# Patient Record
Sex: MALE | Race: WHITE
[De-identification: names, ages, dates, MRNs, and addresses within clinical notes are randomized per-mention and may not be internally consistent; named-entity substitution may affect disease eponyms.]

---

## 2021-03-08 ENCOUNTER — HOSPITAL ENCOUNTER (INPATIENT)
Dept: HOSPITAL 83 - 5E | Age: 26
LOS: 3 days | Discharge: HOME | DRG: 897 | End: 2021-03-11
Attending: INTERNAL MEDICINE | Admitting: INTERNAL MEDICINE
Payer: COMMERCIAL

## 2021-03-08 VITALS — HEIGHT: 75 IN | BODY MASS INDEX: 29.62 KG/M2 | WEIGHT: 238.25 LBS

## 2021-03-08 VITALS — DIASTOLIC BLOOD PRESSURE: 62 MMHG | SYSTOLIC BLOOD PRESSURE: 148 MMHG

## 2021-03-08 VITALS — DIASTOLIC BLOOD PRESSURE: 70 MMHG

## 2021-03-08 VITALS — SYSTOLIC BLOOD PRESSURE: 119 MMHG | DIASTOLIC BLOOD PRESSURE: 66 MMHG

## 2021-03-08 VITALS — DIASTOLIC BLOOD PRESSURE: 62 MMHG

## 2021-03-08 DIAGNOSIS — E66.3: ICD-10-CM

## 2021-03-08 DIAGNOSIS — F10.239: Primary | ICD-10-CM

## 2021-03-08 DIAGNOSIS — R74.01: ICD-10-CM

## 2021-03-08 DIAGNOSIS — F41.9: ICD-10-CM

## 2021-03-08 DIAGNOSIS — Z88.0: ICD-10-CM

## 2021-03-08 LAB
ALBUMIN SERPL-MCNC: 4.2 GM/DL (ref 3.1–4.5)
ALP SERPL-CCNC: 73 U/L (ref 45–117)
ALT SERPL W P-5'-P-CCNC: 63 U/L (ref 12–78)
AMPHETAMINES UR QL SCN: < 1000
AST SERPL-CCNC: 37 IU/L (ref 3–35)
BARBITURATES UR QL SCN: < 200
BASOPHILS # BLD AUTO: 0 10*3/UL (ref 0–0.1)
BASOPHILS NFR BLD AUTO: 0.4 % (ref 0–1)
BENZODIAZ UR QL SCN: < 200
BUN SERPL-MCNC: 15 MG/DL (ref 7–24)
BZE UR QL SCN: < 300
CANNABINOIDS UR QL SCN: < 50
CHLORIDE SERPL-SCNC: 105 MMOL/L (ref 98–107)
CREAT SERPL-MCNC: 0.98 MG/DL (ref 0.7–1.3)
EOSINOPHIL # BLD AUTO: 0.2 10*3/UL (ref 0–0.4)
EOSINOPHIL # BLD AUTO: 2.7 % (ref 1–4)
ERYTHROCYTE [DISTWIDTH] IN BLOOD BY AUTOMATED COUNT: 11.9 % (ref 0–14.5)
ETHANOL SERPL-MCNC: < 3 MG/DL (ref ?–3)
HCT VFR BLD AUTO: 44.7 % (ref 42–52)
INR BLD: 1 (ref 2–3.5)
LYMPHOCYTES # BLD AUTO: 1.8 10*3/UL (ref 1.3–4.4)
LYMPHOCYTES NFR BLD AUTO: 27.1 % (ref 27–41)
MCH RBC QN AUTO: 30.5 PG (ref 27–31)
MCHC RBC AUTO-ENTMCNC: 34.9 G/DL (ref 33–37)
MCV RBC AUTO: 87.3 FL (ref 80–94)
METHADONE UR QL SCN: < 300
MONOCYTES # BLD AUTO: 0.5 10*3/UL (ref 0.1–1)
MONOCYTES NFR BLD MANUAL: 7.9 % (ref 3–9)
NEUT #: 4.1 10*3/UL (ref 2.3–7.9)
NEUT %: 61.8 % (ref 47–73)
NRBC BLD QL AUTO: 0 10*3/UL (ref 0–0)
OPIATES UR QL SCN: < 300
PCP UR QL SCN: <  25
PH UR STRIP: 7.5 [PH] (ref 4.5–8)
PLATELET # BLD AUTO: 182 10*3/UL (ref 130–400)
PMV BLD AUTO: 10.5 FL (ref 9.6–12.3)
POTASSIUM SERPL-SCNC: 4.1 MMOL/L (ref 3.5–5.1)
PROT SERPL-MCNC: 8.3 GM/DL (ref 6.4–8.2)
RBC # BLD AUTO: 5.12 10*6/UL (ref 4.5–5.9)
SODIUM SERPL-SCNC: 138 MMOL/L (ref 136–145)
SP GR UR: 1.01 (ref 1–1.03)
UROBILINOGEN UR STRIP-MCNC: 0.2 E.U./DL (ref 0–1)
WBC #/AREA URNS HPF: (no result) WBC/HPF (ref 0–5)
WBC NRBC COR # BLD AUTO: 6.7 10*3/UL (ref 4.8–10.8)

## 2021-03-09 VITALS — SYSTOLIC BLOOD PRESSURE: 118 MMHG | DIASTOLIC BLOOD PRESSURE: 63 MMHG

## 2021-03-09 VITALS — DIASTOLIC BLOOD PRESSURE: 62 MMHG | SYSTOLIC BLOOD PRESSURE: 110 MMHG

## 2021-03-09 VITALS — SYSTOLIC BLOOD PRESSURE: 119 MMHG | DIASTOLIC BLOOD PRESSURE: 57 MMHG

## 2021-03-09 VITALS — DIASTOLIC BLOOD PRESSURE: 64 MMHG | SYSTOLIC BLOOD PRESSURE: 114 MMHG

## 2021-03-09 VITALS — DIASTOLIC BLOOD PRESSURE: 72 MMHG

## 2021-03-09 VITALS — SYSTOLIC BLOOD PRESSURE: 110 MMHG | DIASTOLIC BLOOD PRESSURE: 62 MMHG

## 2021-03-10 VITALS — DIASTOLIC BLOOD PRESSURE: 65 MMHG

## 2021-03-10 VITALS — SYSTOLIC BLOOD PRESSURE: 131 MMHG | DIASTOLIC BLOOD PRESSURE: 67 MMHG

## 2021-03-10 VITALS — DIASTOLIC BLOOD PRESSURE: 78 MMHG

## 2021-03-10 VITALS — SYSTOLIC BLOOD PRESSURE: 130 MMHG | DIASTOLIC BLOOD PRESSURE: 77 MMHG

## 2021-03-10 VITALS — DIASTOLIC BLOOD PRESSURE: 70 MMHG

## 2021-03-11 VITALS — DIASTOLIC BLOOD PRESSURE: 66 MMHG

## 2023-12-21 ENCOUNTER — OFFICE VISIT (OUTPATIENT)
Dept: PRIMARY CARE CLINIC | Age: 28
End: 2023-12-21
Payer: COMMERCIAL

## 2023-12-21 VITALS
WEIGHT: 233.2 LBS | HEART RATE: 80 BPM | OXYGEN SATURATION: 98 % | TEMPERATURE: 98.7 F | SYSTOLIC BLOOD PRESSURE: 138 MMHG | BODY MASS INDEX: 29.93 KG/M2 | HEIGHT: 74 IN | DIASTOLIC BLOOD PRESSURE: 88 MMHG

## 2023-12-21 DIAGNOSIS — M54.6 CHRONIC RIGHT-SIDED THORACIC BACK PAIN: ICD-10-CM

## 2023-12-21 DIAGNOSIS — Z13.1 SCREENING FOR DIABETES MELLITUS (DM): ICD-10-CM

## 2023-12-21 DIAGNOSIS — Z78.9 ALCOHOL USE: ICD-10-CM

## 2023-12-21 DIAGNOSIS — F41.9 ANXIETY: ICD-10-CM

## 2023-12-21 DIAGNOSIS — Z76.89 ENCOUNTER TO ESTABLISH CARE WITH NEW DOCTOR: Primary | ICD-10-CM

## 2023-12-21 DIAGNOSIS — K21.9 GASTROESOPHAGEAL REFLUX DISEASE, UNSPECIFIED WHETHER ESOPHAGITIS PRESENT: ICD-10-CM

## 2023-12-21 DIAGNOSIS — E66.3 OVERWEIGHT (BMI 25.0-29.9): ICD-10-CM

## 2023-12-21 DIAGNOSIS — F90.2 ATTENTION DEFICIT HYPERACTIVITY DISORDER (ADHD), COMBINED TYPE: ICD-10-CM

## 2023-12-21 DIAGNOSIS — Z13.220 SCREENING FOR LIPID DISORDERS: ICD-10-CM

## 2023-12-21 DIAGNOSIS — G89.29 CHRONIC RIGHT-SIDED LOW BACK PAIN WITHOUT SCIATICA: ICD-10-CM

## 2023-12-21 DIAGNOSIS — Z79.899 LONG-TERM CURRENT USE OF PROTON PUMP INHIBITOR THERAPY: ICD-10-CM

## 2023-12-21 DIAGNOSIS — H61.23 BILATERAL IMPACTED CERUMEN: ICD-10-CM

## 2023-12-21 DIAGNOSIS — M54.50 CHRONIC RIGHT-SIDED LOW BACK PAIN WITHOUT SCIATICA: ICD-10-CM

## 2023-12-21 DIAGNOSIS — J30.2 SEASONAL ALLERGIES: ICD-10-CM

## 2023-12-21 DIAGNOSIS — G89.29 CHRONIC RIGHT-SIDED THORACIC BACK PAIN: ICD-10-CM

## 2023-12-21 PROBLEM — G43.909 MIGRAINE HEADACHE: Status: ACTIVE | Noted: 2023-12-21

## 2023-12-21 PROBLEM — H61.20 EXCESSIVE CERUMEN IN EAR CANAL: Status: ACTIVE | Noted: 2023-12-21

## 2023-12-21 PROCEDURE — 99204 OFFICE O/P NEW MOD 45 MIN: CPT | Performed by: STUDENT IN AN ORGANIZED HEALTH CARE EDUCATION/TRAINING PROGRAM

## 2023-12-21 PROCEDURE — 69209 REMOVE IMPACTED EAR WAX UNI: CPT | Performed by: STUDENT IN AN ORGANIZED HEALTH CARE EDUCATION/TRAINING PROGRAM

## 2023-12-21 RX ORDER — LORATADINE 10 MG/1
10 TABLET ORAL
COMMUNITY
Start: 2021-05-03

## 2023-12-21 RX ORDER — FAMOTIDINE 40 MG/1
40 TABLET, FILM COATED ORAL NIGHTLY
Qty: 30 TABLET | Refills: 2 | Status: SHIPPED | OUTPATIENT
Start: 2023-12-21

## 2023-12-21 RX ORDER — GABAPENTIN 100 MG/1
100 CAPSULE ORAL 3 TIMES DAILY
Qty: 90 CAPSULE | Refills: 2 | Status: SHIPPED | OUTPATIENT
Start: 2023-12-21 | End: 2024-03-20

## 2023-12-21 RX ORDER — DEXTROAMPHETAMINE SACCHARATE, AMPHETAMINE ASPARTATE, DEXTROAMPHETAMINE SULFATE AND AMPHETAMINE SULFATE 3.75; 3.75; 3.75; 3.75 MG/1; MG/1; MG/1; MG/1
15 TABLET ORAL DAILY
COMMUNITY
Start: 2023-12-15

## 2023-12-21 RX ORDER — OMEPRAZOLE 40 MG/1
40 CAPSULE, DELAYED RELEASE ORAL DAILY
COMMUNITY
End: 2023-12-21 | Stop reason: SDUPTHER

## 2023-12-21 RX ORDER — OMEPRAZOLE 40 MG/1
40 CAPSULE, DELAYED RELEASE ORAL
Qty: 30 CAPSULE | Refills: 2 | Status: SHIPPED | OUTPATIENT
Start: 2023-12-21

## 2023-12-21 RX ORDER — PROPRANOLOL HYDROCHLORIDE 20 MG/1
20 TABLET ORAL
COMMUNITY

## 2023-12-21 RX ORDER — LEVOCETIRIZINE DIHYDROCHLORIDE 5 MG/1
5 TABLET, FILM COATED ORAL NIGHTLY
Qty: 30 TABLET | Refills: 12 | Status: SHIPPED | OUTPATIENT
Start: 2023-12-21

## 2023-12-21 SDOH — ECONOMIC STABILITY: HOUSING INSECURITY
IN THE LAST 12 MONTHS, WAS THERE A TIME WHEN YOU DID NOT HAVE A STEADY PLACE TO SLEEP OR SLEPT IN A SHELTER (INCLUDING NOW)?: NO

## 2023-12-21 SDOH — ECONOMIC STABILITY: FOOD INSECURITY: WITHIN THE PAST 12 MONTHS, THE FOOD YOU BOUGHT JUST DIDN'T LAST AND YOU DIDN'T HAVE MONEY TO GET MORE.: NEVER TRUE

## 2023-12-21 SDOH — ECONOMIC STABILITY: INCOME INSECURITY: HOW HARD IS IT FOR YOU TO PAY FOR THE VERY BASICS LIKE FOOD, HOUSING, MEDICAL CARE, AND HEATING?: NOT HARD AT ALL

## 2023-12-21 SDOH — ECONOMIC STABILITY: FOOD INSECURITY: WITHIN THE PAST 12 MONTHS, YOU WORRIED THAT YOUR FOOD WOULD RUN OUT BEFORE YOU GOT MONEY TO BUY MORE.: NEVER TRUE

## 2023-12-21 ASSESSMENT — PATIENT HEALTH QUESTIONNAIRE - PHQ9
SUM OF ALL RESPONSES TO PHQ QUESTIONS 1-9: 0
SUM OF ALL RESPONSES TO PHQ9 QUESTIONS 1 & 2: 0
SUM OF ALL RESPONSES TO PHQ QUESTIONS 1-9: 0
1. LITTLE INTEREST OR PLEASURE IN DOING THINGS: 0
SUM OF ALL RESPONSES TO PHQ QUESTIONS 1-9: 0
SUM OF ALL RESPONSES TO PHQ QUESTIONS 1-9: 0
2. FEELING DOWN, DEPRESSED OR HOPELESS: 0

## 2023-12-21 NOTE — PROGRESS NOTES
NEW PRIMARY CARE VISIT    23  Name: Eber Mina   : 1995   Age: 28 y.o.  Sex: male        Assessment & Plan:     Problem List Items Addressed This Visit          Digestive    Gastroesophageal reflux disease     Chronic, uncontrolled  Continue omeprazole 40 mg daily  Start Pepcid 40 mg nightly  Referral to general surgery to consider EGD         Relevant Medications    famotidine (PEPCID) 40 MG tablet    omeprazole (PRILOSEC) 40 MG delayed release capsule    Other Relevant Orders    External Referral To General Surgery       Other    Anxiety     Chronic, partially controlled  Follows with psych  Continue propanolol 20 mg daily         Relevant Orders    Comprehensive Metabolic Panel    CBC    TSH    Attention deficit hyperactivity disorder (ADHD), combined type     Chronic, partially controlled  Follows with psych  Continue Adderall 15 mg daily, consider XR         Seasonal allergies     Change Claritin to Xyzal 5 mg nightly         Relevant Medications    levocetirizine (XYZAL) 5 MG tablet    Chronic right-sided thoracic back pain     Chronic, uncontrolled  Xrays previously negative per patient, records requested  Failed PT  Check MR  Referral to pain management  Trial gabapentin 100 mg 3 times daily         Relevant Medications    gabapentin (NEURONTIN) 100 MG capsule    Other Relevant Orders    External Referral To Pain Clinic    MRI THORACIC SPINE WO CONTRAST    Chronic right-sided low back pain without sciatica     Chronic, uncontrolled  Xrays previously negative per patient, records requested  Failed PT and trigger point injections  Check MR  Referral to pain management  Trial gabapentin 100 mg 3 times daily         Relevant Medications    gabapentin (NEURONTIN) 100 MG capsule    Other Relevant Orders    External Referral To Pain Clinic    MRI LUMBAR SPINE WO CONTRAST    Overweight (BMI 25.0-29.9)     Check screening labs         Relevant Orders    Comprehensive Metabolic Panel    Lipid Panel

## 2024-01-03 ENCOUNTER — OFFICE VISIT (OUTPATIENT)
Dept: PRIMARY CARE CLINIC | Age: 29
End: 2024-01-03
Payer: COMMERCIAL

## 2024-01-03 VITALS
TEMPERATURE: 98.1 F | WEIGHT: 231.8 LBS | DIASTOLIC BLOOD PRESSURE: 70 MMHG | RESPIRATION RATE: 14 BRPM | HEART RATE: 85 BPM | OXYGEN SATURATION: 98 % | HEIGHT: 74 IN | SYSTOLIC BLOOD PRESSURE: 100 MMHG | BODY MASS INDEX: 29.75 KG/M2

## 2024-01-03 DIAGNOSIS — R51.9 FRONTAL HEADACHE: ICD-10-CM

## 2024-01-03 DIAGNOSIS — J10.1 INFLUENZA A: Primary | ICD-10-CM

## 2024-01-03 DIAGNOSIS — R50.9 FEBRILE ILLNESS, ACUTE: ICD-10-CM

## 2024-01-03 PROBLEM — Z78.9 ALCOHOL USE: Status: ACTIVE | Noted: 2024-01-03

## 2024-01-03 PROBLEM — G89.29 CHRONIC RIGHT-SIDED THORACIC BACK PAIN: Status: ACTIVE | Noted: 2024-01-03

## 2024-01-03 PROBLEM — K21.9 GASTROESOPHAGEAL REFLUX DISEASE: Status: ACTIVE | Noted: 2024-01-03

## 2024-01-03 PROBLEM — M54.50 CHRONIC RIGHT-SIDED LOW BACK PAIN WITHOUT SCIATICA: Status: ACTIVE | Noted: 2024-01-03

## 2024-01-03 PROBLEM — G89.29 CHRONIC RIGHT-SIDED LOW BACK PAIN WITHOUT SCIATICA: Status: ACTIVE | Noted: 2024-01-03

## 2024-01-03 PROBLEM — E66.3 OVERWEIGHT (BMI 25.0-29.9): Status: ACTIVE | Noted: 2024-01-03

## 2024-01-03 PROBLEM — M54.6 CHRONIC RIGHT-SIDED THORACIC BACK PAIN: Status: ACTIVE | Noted: 2024-01-03

## 2024-01-03 LAB
INFLUENZA A ANTIGEN, POC: POSITIVE
INFLUENZA B ANTIGEN, POC: NEGATIVE
LOT EXPIRE DATE: ABNORMAL
LOT KIT NUMBER: ABNORMAL
SARS-COV-2, POC: ABNORMAL
VALID INTERNAL CONTROL: YES
VENDOR AND KIT NAME POC: ABNORMAL

## 2024-01-03 PROCEDURE — 87428 SARSCOV & INF VIR A&B AG IA: CPT | Performed by: EMERGENCY MEDICINE

## 2024-01-03 PROCEDURE — 99213 OFFICE O/P EST LOW 20 MIN: CPT | Performed by: EMERGENCY MEDICINE

## 2024-01-03 RX ORDER — ALBUTEROL SULFATE 90 UG/1
2 AEROSOL, METERED RESPIRATORY (INHALATION) 4 TIMES DAILY PRN
Qty: 18 G | Refills: 0 | Status: SHIPPED | OUTPATIENT
Start: 2024-01-03

## 2024-01-03 RX ORDER — BROMPHENIRAMINE MALEATE, PSEUDOEPHEDRINE HYDROCHLORIDE, AND DEXTROMETHORPHAN HYDROBROMIDE 2; 30; 10 MG/5ML; MG/5ML; MG/5ML
5 SYRUP ORAL 4 TIMES DAILY PRN
Qty: 118 ML | Refills: 0 | Status: SHIPPED | OUTPATIENT
Start: 2024-01-03

## 2024-01-03 ASSESSMENT — ENCOUNTER SYMPTOMS
EYE REDNESS: 0
BACK PAIN: 0
BACK PAIN: 1
SORE THROAT: 0
COUGH: 1
SHORTNESS OF BREATH: 0
ABDOMINAL PAIN: 1
NAUSEA: 0
DIARRHEA: 0
CHEST TIGHTNESS: 1
CONSTIPATION: 0
EYE DISCHARGE: 0
SINUS PRESSURE: 0
SHORTNESS OF BREATH: 0
WHEEZING: 0
ABDOMINAL PAIN: 0
EYE PAIN: 0
VOMITING: 0
DIARRHEA: 0

## 2024-01-03 ASSESSMENT — PATIENT HEALTH QUESTIONNAIRE - PHQ9
SUM OF ALL RESPONSES TO PHQ QUESTIONS 1-9: 0
2. FEELING DOWN, DEPRESSED OR HOPELESS: 0
SUM OF ALL RESPONSES TO PHQ QUESTIONS 1-9: 0
SUM OF ALL RESPONSES TO PHQ QUESTIONS 1-9: 0
SUM OF ALL RESPONSES TO PHQ9 QUESTIONS 1 & 2: 0
1. LITTLE INTEREST OR PLEASURE IN DOING THINGS: 0
SUM OF ALL RESPONSES TO PHQ QUESTIONS 1-9: 0

## 2024-01-03 NOTE — PATIENT INSTRUCTIONS
Learning About Using Inhalers Correctly  Why is an inhaler used?     An inhaler is used to send medicine right to your lungs. It's often used for asthma, COPD (chronic obstructive pulmonary disease), and other lung diseasesthat make it hard to breathe.  Using an inhaler:  Sends most of the medicine straight to your lungs.  Provides a measured dose of the medicine.  Can help keep your symptoms under control.  Can limit long-term damage to your lungs.  Can be safer than if you took a pill or liquid medicine.  Works just as well, is easier to carry, and is faster to use than a nebulizer machine.  What devices can you use?  Different types of devices can send inhaled medicine straight to your lungs. They include metered-dose inhalers, dry powder inhalers, soft mist inhalers,and nebulizers. You may need to use more than one type of device.  How can you use it correctly?  Each kind of inhaler is used differently.  Make sure to save the 's instructions. Don't throw them away.  Pay attention to the instructions for your specific inhaler. Look fordirections on:  Whether to prime or shake it.  Whether to use a spacer or mask.  Whether you have to load medicine into the inhaler.  How to use the inhaler to deliver the medicine. This includes how to hold it, when to breathe, and how long to hold your breath.  How to know how many doses are left.  How to clean it.  How to store it.  When to throw it away.  Why is it important to use it correctly?  Correct use of an inhaler:  Makes sure that the lungs get the full dose. This can mean fewer symptoms and better treatment.  Leads to fewer side effects. Sometimes people have side effects if the medicine hits the inside of the mouth instead of going into the lungs.  Saves money.  Ask your doctor, nurse, pharmacist, or respiratory therapist to show you how to use the inhaler. They might ask you to show them how you use it, so they canhelp fix any problems.  Follow-up

## 2024-01-03 NOTE — PROGRESS NOTES
Cardiovascular:      Rate and Rhythm: Normal rate and regular rhythm.      Heart sounds: Normal heart sounds. No murmur heard.  Pulmonary:      Effort: Pulmonary effort is normal. No respiratory distress.      Breath sounds: Wheezing present. No rales.   Abdominal:      General: Bowel sounds are normal.      Palpations: Abdomen is soft.      Tenderness: There is no abdominal tenderness. There is no guarding or rebound.   Musculoskeletal:      Cervical back: Normal range of motion and neck supple.   Skin:     General: Skin is warm and dry.   Neurological:      Mental Status: He is alert and oriented to person, place, and time.      Cranial Nerves: No cranial nerve deficit.      Coordination: Coordination normal.         Test Results Section   (All laboratory and radiology results have been personally reviewed by myself)  Labs:  Results for orders placed or performed in visit on 24   POCT COVID-19 & Influenza A/B   Result Value Ref Range    VALID INTERNAL CONTROL YES     Lot/Kit Number [id number]     Lot/Kit  date: 10/27/2024     SARS-COV-2, POC Not-Detected Not Detected    Influenza A Antigen, POC Positive (A)     Influenza B Antigen, POC Negative     Vendor and kit name [employer]         Imaging:  All Radiology results interpreted by Radiologist unless otherwise noted.  No results found.      Assessment / Plan   Impression(s):  Eber was seen today for fever, generalized body aches, chills and headache.    Diagnoses and all orders for this visit:    Influenza A  -     brompheniramine-pseudoephedrine-DM 2-30-10 MG/5ML syrup; Take 5 mLs by mouth 4 times daily as needed for Congestion or Cough  -     albuterol sulfate HFA (VENTOLIN HFA) 108 (90 Base) MCG/ACT inhaler; Inhale 2 puffs into the lungs 4 times daily as needed for Wheezing    Frontal headache    Other orders  -     POCT COVID-19 & Influenza A/B         Discussed symptomatic treatments with the patient today.   Return if symptoms worsen or fail to

## 2024-01-04 NOTE — ASSESSMENT & PLAN NOTE
Chronic, uncontrolled  Xrays previously negative per patient, records requested  Failed PT and trigger point injections  Check MR  Referral to pain management  Trial gabapentin 100 mg 3 times daily

## 2024-01-04 NOTE — PROGRESS NOTES
From what I can tell, patient was here on walk in yesterday and provided Verbal [employer] insurance information. Voicemail was left just to clarify everything.   Dr. Donahue, can you please provide new orders/referral with the updated [employer] info on and we will get it sent to [facility]?

## 2024-01-04 NOTE — PROGRESS NOTES
From what I can tell, patient was here on walk in yesterday and provided Verbal [employer] insurance information. Voicemail was left just to clarify everything.   Dr. Donahue, can you please provide new orders/referral with the updated [employer] info on and we will get it sent to [facility]?

## 2024-01-04 NOTE — ASSESSMENT & PLAN NOTE
Chronic, uncontrolled  Continue omeprazole 40 mg daily  Start Pepcid 40 mg nightly  Referral to general surgery to consider EGD

## 2024-01-17 ENCOUNTER — OFFICE VISIT (OUTPATIENT)
Dept: PAIN MANAGEMENT | Age: 29
End: 2024-01-17
Payer: COMMERCIAL

## 2024-01-17 VITALS
RESPIRATION RATE: 16 BRPM | HEIGHT: 74 IN | DIASTOLIC BLOOD PRESSURE: 86 MMHG | BODY MASS INDEX: 29.52 KG/M2 | HEART RATE: 83 BPM | OXYGEN SATURATION: 97 % | WEIGHT: 230 LBS | SYSTOLIC BLOOD PRESSURE: 122 MMHG | TEMPERATURE: 97.2 F

## 2024-01-17 DIAGNOSIS — M54.6 CHRONIC RIGHT-SIDED THORACIC BACK PAIN: Primary | ICD-10-CM

## 2024-01-17 DIAGNOSIS — G89.29 CHRONIC RIGHT-SIDED THORACIC BACK PAIN: Primary | ICD-10-CM

## 2024-01-17 DIAGNOSIS — M79.18 MYOFASCIAL PAIN SYNDROME: ICD-10-CM

## 2024-01-17 PROCEDURE — 99214 OFFICE O/P EST MOD 30 MIN: CPT | Performed by: STUDENT IN AN ORGANIZED HEALTH CARE EDUCATION/TRAINING PROGRAM

## 2024-01-17 NOTE — PROGRESS NOTES
[facility] - Pain Medicine  [address] [place] [address]  Pain Medicine Consult Note    Patient:  ASIA Gabriel 1995  Date of Service:  24  Requesting Physician:  Bijan Donahue MD  Chief Complaint: New Patient (Thoracic pain )      HISTORY OF PRESENT ILLNESS:      Mr. Eber Mina is a 29 y.o. male presented today for evaluation of  right mid back pain that has been ongoing for the past 1 year    He   has a PMH of GERD, Anxiety ADHD,  .     Pain:  Location: right mid back  Inciting Factor: unsure  Duration: 1 year  Description: intermittent  Quality: aching, stabbing, and throbbing.    Level (worst): 10  Radiation:  no  Numbness/Tingling: no  Aggravating factors: movement.   Alleviating factors: rest.    Blood Thinners/Anticoagulation:  no  Herbal Supplements: no  Pertinent Allergies: no  Diabetic: no  Bowel/Bladder Incontinence: no    Medications:    NSAID's : yes - diclofenac , ibuprofen  APAPs: yes - tylenol    Patches/Gels: yes - biofreeze, lidocaine patches, salonpas   Membrane stabilizers :   Current - yes - Gabapentin 100mg TID   Previous - no   Opioids :   Current - no   Previous - no   Muscle Relaxants: yes, zanaflex   Steroids: no   Benzodiazepines: no   Anti-depres/Anti-Pscyh: yes - Adderall   Adjuvants or Others : no      Previous treatments:    Physical Therapy : yes, [facility]     Chiropractic treatment: yes, [facility]    TENS Unit: no   Surgeries: no   Interventional Pain procedures/ nerve blocks: yes, [facility]    TPI: yes      Current Medications:   albuterol sulfate HFA, amphetamine-dextroamphetamine, brompheniramine-pseudoephedrine-DM, diclofenac sodium, famotidine, gabapentin, levocetirizine, loratadine, medical marijuana, omeprazole, and propranolol     Social History:  Work Hx: employed   Currently in Litigation: denies    H/O Smoking: denies   H/O alcohol abuse : yes, 2-3 / day    H/O Illicit drug use : medical marijuana    Social History

## 2024-01-17 NOTE — PROGRESS NOTES
Patient:  ASIA Gabriel 1995  Date of Service:  24      Patient presents to [facility] with complaints of thoracic  pain that started 1 years ago and has been getting unchanged.     He states the pain began following No specific cause    Pain is constant and is described as aching, throbbing, shooting, and stabbing. He rates the pain as a 10/10 on his worst day , 5/10 on his best day, and a 7/10 on average on the VAS scale.     Pain does not radiate . He  does not have numbness, tingling, weakness   Alleviating factors include: rest.  Aggravating factors include:  movement. He states that the pain does keep him from sleeping at night. He took his last dose of Neurontin last night .     He is not on NSAIDS and  is not on anticoagulation medications       Previous treatments: Physical Therapy.      Personal Expectations from this treatment: increase activity and decrease pain    Temp 97.2 °F (36.2 °C) (Temporal)   Resp 16   Ht 1.88 m (6' 2\")   Wt 104.3 kg (230 lb)   BMI 29.53 kg/m²     No LMP for male patient.

## 2024-03-05 ENCOUNTER — OFFICE VISIT (OUTPATIENT)
Dept: PRIMARY CARE CLINIC | Age: 29
End: 2024-03-05
Payer: COMMERCIAL

## 2024-03-05 VITALS
TEMPERATURE: 97.4 F | BODY MASS INDEX: 30.39 KG/M2 | SYSTOLIC BLOOD PRESSURE: 130 MMHG | HEIGHT: 74 IN | HEART RATE: 69 BPM | WEIGHT: 236.8 LBS | DIASTOLIC BLOOD PRESSURE: 80 MMHG | OXYGEN SATURATION: 96 %

## 2024-03-05 DIAGNOSIS — E66.9 CLASS 1 OBESITY WITHOUT SERIOUS COMORBIDITY WITH BODY MASS INDEX (BMI) OF 30.0 TO 30.9 IN ADULT, UNSPECIFIED OBESITY TYPE: ICD-10-CM

## 2024-03-05 DIAGNOSIS — E53.8 B12 DEFICIENCY: Primary | ICD-10-CM

## 2024-03-05 DIAGNOSIS — Z13.220 SCREENING FOR LIPID DISORDERS: ICD-10-CM

## 2024-03-05 DIAGNOSIS — Z79.899 LONG-TERM CURRENT USE OF PROTON PUMP INHIBITOR THERAPY: ICD-10-CM

## 2024-03-05 DIAGNOSIS — Z13.1 SCREENING FOR DIABETES MELLITUS (DM): ICD-10-CM

## 2024-03-05 DIAGNOSIS — F41.9 ANXIETY: ICD-10-CM

## 2024-03-05 DIAGNOSIS — K21.9 GASTROESOPHAGEAL REFLUX DISEASE, UNSPECIFIED WHETHER ESOPHAGITIS PRESENT: ICD-10-CM

## 2024-03-05 DIAGNOSIS — G89.29 CHRONIC RIGHT-SIDED LOW BACK PAIN WITHOUT SCIATICA: ICD-10-CM

## 2024-03-05 DIAGNOSIS — M54.6 CHRONIC RIGHT-SIDED THORACIC BACK PAIN: Primary | ICD-10-CM

## 2024-03-05 DIAGNOSIS — G89.29 CHRONIC RIGHT-SIDED THORACIC BACK PAIN: Primary | ICD-10-CM

## 2024-03-05 DIAGNOSIS — J30.2 SEASONAL ALLERGIES: ICD-10-CM

## 2024-03-05 DIAGNOSIS — M54.50 CHRONIC RIGHT-SIDED LOW BACK PAIN WITHOUT SCIATICA: ICD-10-CM

## 2024-03-05 LAB
ALBUMIN SERPL-MCNC: 4.6 G/DL (ref 3.5–5.2)
ALP BLD-CCNC: 75 U/L (ref 40–129)
ALT SERPL-CCNC: 26 U/L (ref 0–40)
ANION GAP SERPL CALCULATED.3IONS-SCNC: 20 MMOL/L (ref 7–16)
AST SERPL-CCNC: 27 U/L (ref 0–39)
BILIRUB SERPL-MCNC: 0.4 MG/DL (ref 0–1.2)
BUN BLDV-MCNC: 11 MG/DL (ref 6–20)
CALCIUM SERPL-MCNC: 9.7 MG/DL (ref 8.6–10.2)
CHLORIDE BLD-SCNC: 104 MMOL/L (ref 98–107)
CHOLESTEROL: 242 MG/DL
CO2: 19 MMOL/L (ref 22–29)
CREAT SERPL-MCNC: 1.1 MG/DL (ref 0.7–1.2)
FOLATE: >20 NG/ML (ref 4.8–24.2)
GFR SERPL CREATININE-BSD FRML MDRD: >60 ML/MIN/1.73M2
GLUCOSE BLD-MCNC: 92 MG/DL (ref 74–99)
HCT VFR BLD CALC: 44.7 % (ref 37–54)
HDLC SERPL-MCNC: 56 MG/DL
HEMOGLOBIN: 15.2 G/DL (ref 12.5–16.5)
LDL CHOLESTEROL: 133 MG/DL
MAGNESIUM: 2.3 MG/DL (ref 1.6–2.6)
MCH RBC QN AUTO: 30.5 PG (ref 26–35)
MCHC RBC AUTO-ENTMCNC: 34 G/DL (ref 32–34.5)
MCV RBC AUTO: 89.8 FL (ref 80–99.9)
PDW BLD-RTO: 12.3 % (ref 11.5–15)
PLATELET # BLD: 187 K/UL (ref 130–450)
PMV BLD AUTO: 10.9 FL (ref 7–12)
POTASSIUM SERPL-SCNC: 4.4 MMOL/L (ref 3.5–5)
RBC # BLD: 4.98 M/UL (ref 3.8–5.8)
SODIUM BLD-SCNC: 143 MMOL/L (ref 132–146)
TOTAL PROTEIN: 7.8 G/DL (ref 6.4–8.3)
TRIGL SERPL-MCNC: 263 MG/DL
TSH SERPL DL<=0.05 MIU/L-ACNC: 1.71 UIU/ML (ref 0.27–4.2)
VITAMIN B-12: 362 PG/ML (ref 211–946)
VLDLC SERPL CALC-MCNC: 53 MG/DL
WBC # BLD: 5.7 K/UL (ref 4.5–11.5)

## 2024-03-05 PROCEDURE — 99214 OFFICE O/P EST MOD 30 MIN: CPT | Performed by: STUDENT IN AN ORGANIZED HEALTH CARE EDUCATION/TRAINING PROGRAM

## 2024-03-05 RX ORDER — LEVOCETIRIZINE DIHYDROCHLORIDE 5 MG/1
5 TABLET, FILM COATED ORAL NIGHTLY
Qty: 30 TABLET | Refills: 12 | Status: CANCELLED | OUTPATIENT
Start: 2024-03-05

## 2024-03-05 RX ORDER — FAMOTIDINE 40 MG/1
40 TABLET, FILM COATED ORAL NIGHTLY
Qty: 30 TABLET | Refills: 3 | Status: SHIPPED | OUTPATIENT
Start: 2024-03-05

## 2024-03-05 RX ORDER — OMEPRAZOLE 40 MG/1
40 CAPSULE, DELAYED RELEASE ORAL
Qty: 30 CAPSULE | Refills: 3 | Status: SHIPPED | OUTPATIENT
Start: 2024-03-05

## 2024-03-05 RX ORDER — MAGNESIUM 200 MG
1 TABLET ORAL
Qty: 30 TABLET | Refills: 4 | Status: SHIPPED | OUTPATIENT
Start: 2024-03-05

## 2024-03-05 RX ORDER — GABAPENTIN 100 MG/1
100 CAPSULE ORAL 3 TIMES DAILY
Qty: 90 CAPSULE | Refills: 3 | Status: SHIPPED | OUTPATIENT
Start: 2024-03-05 | End: 2024-07-03

## 2024-03-05 RX ORDER — HYDROXYZINE HYDROCHLORIDE 25 MG/1
TABLET, FILM COATED ORAL
COMMUNITY
Start: 2024-01-09

## 2024-03-05 NOTE — PROGRESS NOTES
ESTABLISHED PRIMARY CARE VISIT    3/5/24  Name: Eber Mina   : 1995   Age: 29 y.o.  Sex: male        Assessment & Plan:     Problem List Items Addressed This Visit       Anxiety     Chronic, partially controlled  Follows with psych  Continue propanolol 20 mg daily         Relevant Medications    hydrOXYzine HCl (ATARAX) 25 MG tablet    Other Relevant Orders    TSH (Completed)    CBC (Completed)    Comprehensive Metabolic Panel (Completed)    Seasonal allergies     Continue Xyzal 5 mg nightly         Chronic right-sided thoracic back pain - Primary     Chronic, uncontrolled  Xrays previously negative per patient, records requested  Failed 6 weeks PT  Check MR, reordered to [facility] given following with [facility]  Continue gabapentin 100 mg 3 times daily         Relevant Medications    gabapentin (NEURONTIN) 100 MG capsule    diclofenac sodium (VOLTAREN) 1 % GEL    Other Relevant Orders    MRI THORACIC SPINE WO CONTRAST    Chronic right-sided low back pain without sciatica     Chronic, uncontrolled  Xrays previously negative per patient, records requested  Failed 6 weeks PT  Check MR, reordered to [facility] given following with [facility]  Continue gabapentin 100 mg 3 times daily         Relevant Medications    gabapentin (NEURONTIN) 100 MG capsule    diclofenac sodium (VOLTAREN) 1 % GEL    Other Relevant Orders    MRI LUMBAR SPINE WO CONTRAST    Gastroesophageal reflux disease     Chronic, improved  Continue omeprazole 40 mg daily, Pepcid 40 mg nightly  Following with GI, considering EGD         Relevant Medications    omeprazole (PRILOSEC) 40 MG delayed release capsule    famotidine (PEPCID) 40 MG tablet    Class 1 obesity without serious comorbidity with body mass index (BMI) of 30.0 to 30.9 in adult     Worsened BMI  Check labs  Counseled on healthy lifestyle changes         Relevant Orders    Lipid Panel (Completed)    CBC (Completed)    Comprehensive Metabolic Panel (Completed)

## 2024-03-05 NOTE — PATIENT INSTRUCTIONS
[facility] should call you for MRIs  Call Dr. OLEA after MRIs are scheduled, book after MRI  Call Dr. Delgado if you haven't heard about doing scope

## 2024-03-11 NOTE — ASSESSMENT & PLAN NOTE
Chronic, improved  Continue omeprazole 40 mg daily, Pepcid 40 mg nightly  Following with GI, considering EGD

## 2024-03-11 NOTE — ASSESSMENT & PLAN NOTE
Chronic, uncontrolled  Xrays previously negative per patient, records requested  Failed 6 weeks PT  Check MR, reordered to [facility] given following with [facility]  Continue gabapentin 100 mg 3 times daily

## 2024-03-11 NOTE — ASSESSMENT & PLAN NOTE
Chronic, uncontrolled  Xrays previously negative per patient, records requested  Failed 6 weeks PT  Check MR, reordered to [facility] given following with [facility]  Continue gabapentin 100 mg 3 times daily

## 2024-03-21 ENCOUNTER — HOSPITAL ENCOUNTER (OUTPATIENT)
Dept: MRI IMAGING | Age: 29
Discharge: HOME | End: 2024-03-23
Payer: COMMERCIAL

## 2024-03-21 DIAGNOSIS — G89.29 CHRONIC RIGHT-SIDED THORACIC BACK PAIN: ICD-10-CM

## 2024-03-21 DIAGNOSIS — M54.6 CHRONIC RIGHT-SIDED THORACIC BACK PAIN: ICD-10-CM

## 2024-03-21 DIAGNOSIS — G89.29 CHRONIC RIGHT-SIDED LOW BACK PAIN WITHOUT SCIATICA: ICD-10-CM

## 2024-03-21 DIAGNOSIS — M54.50 CHRONIC RIGHT-SIDED LOW BACK PAIN WITHOUT SCIATICA: ICD-10-CM

## 2024-03-21 PROCEDURE — 72148 MRI LUMBAR SPINE W/O DYE: CPT

## 2024-03-21 PROCEDURE — 72146 MRI CHEST SPINE W/O DYE: CPT

## 2024-04-29 DIAGNOSIS — G89.29 CHRONIC PAIN OF RIGHT ANKLE: Primary | ICD-10-CM

## 2024-04-29 DIAGNOSIS — Z87.81 HISTORY OF FRACTURE OF FIBULA: ICD-10-CM

## 2024-04-29 DIAGNOSIS — M25.571 CHRONIC PAIN OF RIGHT ANKLE: Primary | ICD-10-CM
